# Patient Record
Sex: MALE | Race: WHITE | NOT HISPANIC OR LATINO | Employment: OTHER | URBAN - METROPOLITAN AREA
[De-identification: names, ages, dates, MRNs, and addresses within clinical notes are randomized per-mention and may not be internally consistent; named-entity substitution may affect disease eponyms.]

---

## 2017-08-18 ENCOUNTER — OFFICE VISIT (OUTPATIENT)
Dept: PULMONOLOGY | Facility: HOSPICE | Age: 82
End: 2017-08-18
Payer: MEDICARE

## 2017-08-18 VITALS
RESPIRATION RATE: 16 BRPM | BODY MASS INDEX: 25.76 KG/M2 | HEIGHT: 68 IN | HEART RATE: 58 BPM | OXYGEN SATURATION: 98 % | TEMPERATURE: 97.5 F | DIASTOLIC BLOOD PRESSURE: 78 MMHG | SYSTOLIC BLOOD PRESSURE: 144 MMHG | WEIGHT: 170 LBS

## 2017-08-18 DIAGNOSIS — J44.9 CHRONIC OBSTRUCTIVE PULMONARY DISEASE, UNSPECIFIED COPD TYPE (HCC): ICD-10-CM

## 2017-08-18 PROCEDURE — 99213 OFFICE O/P EST LOW 20 MIN: CPT | Performed by: NURSE PRACTITIONER

## 2017-08-18 NOTE — MR AVS SNAPSHOT
"        Ta Jones   2017 9:40 AM   Office Visit   MRN: [id number]    Department:  [facility]   Dept Phone:  [phone]    Description:  Male : 1930   Provider:  LIZANDRO Mejía           Reason for Visit     Annual Exam           Allergies as of 2017     Allergen Noted Reactions    Advil [Ibuprofen] 2016         You were diagnosed with     Chronic obstructive pulmonary disease, unspecified COPD type (CMS-HCC)   [[id number]]         Vital Signs     Blood Pressure Pulse Temperature Respirations Height Weight    144/78 mmHg 58 36.4 °C (97.5 °F) 16 1.727 m (5' 7.99\") 77.111 kg (170 lb)    Body Mass Index Oxygen Saturation Smoking Status             25.85 kg/m2 98% Former Smoker         Basic Information     Date Of Birth Sex Race Ethnicity Preferred Language    1930 Male White Non- English      Your appointments     2018 11:00 AM   Established Patient Pul with A Rotation   [facility] (--)    [address] [place] [address] [address]  [facility] [id number] [phone]              Problem List              ICD-10-CM Priority Class Noted - Resolved    COPD (chronic obstructive pulmonary disease) (CMS-HCC) J44.9   2017 - Present      Health Maintenance        Date Due Completion Dates    COLONOSCOPY 1980 ---    IMM ZOSTER VACCINE 1990 ---    IMM DTaP/Tdap/Td Vaccine (1 - Tdap) 2010    IMM PNEUMOCOCCAL 65+ (ADULT) LOW/MEDIUM RISK SERIES (2 of 2 - PCV13) 2016    IMM INFLUENZA (1) 2017            Current Immunizations     Influenza TIV (IM) 2015    Pneumococcal polysaccharide vaccine (PPSV-23) 2015    TD Vaccine 2010      Below and/or attached are the medications your provider expects you to take. Review all of your home medications and newly ordered medications with your provider and/or pharmacist. Follow medication instructions as directed by your provider and/or pharmacist. " Please keep your medication list with you and share with your provider. Update the information when medications are discontinued, doses are changed, or new medications (including over-the-counter products) are added; and carry medication information at all times in the event of emergency situations     Allergies:  ADVIL - (reactions not documented)               Medications  Valid as of: August 18, 2017 - 10:01 AM    Generic Name Brand Name Tablet Size Instructions for use    Albuterol Sulfate (Aero Soln) albuterol 108 (90 Base) MCG/ACT Inhale 2 Puffs by mouth every four hours as needed for Shortness of Breath.        Albuterol Sulfate (Aero Soln) albuterol 108 (90 Base) MCG/ACT Inhale 2 Puffs by mouth every 6 hours as needed for Shortness of Breath.        Alendronate Sodium (Tab) FOSAMAX 70 MG Q7 DAYS.         Atorvastatin Calcium (Tab) LIPITOR 20 MG Take 20 mg by mouth every evening.        Cholecalciferol (Tab) Vitamin D3 2000 units Take  by mouth.        Clopidogrel Bisulfate (Tab) PLAVIX 75 MG Take 75 mg by mouth every day. Indications: Heart Attack        Coenzyme Q10 (Cap) Co Q-10 100 MG Take  by mouth.        Formoterol Fumarate (Cap) FORADIL AEROLIZER 12 MCG BID.         HydroCHLOROthiazide   by Does not apply route.        Ipratropium-Albuterol   17 mcg 4X/DAY.         Levalbuterol Tartrate   59 mcg 4X/DAY.         Lovastatin (Tab) MEVACOR 40 MG QDAY.         Metoprolol Succinate (TABLET SR 24 HR) TOPROL XL 25 MG Take 12.5 mg by mouth every day.        Metoprolol-Hydrochlorothiazide (TABLET SR 24 HR) Metoprolol-Hydrochlorothiazide 50-12.5 MG Take 1 Tab by mouth every day.        Mometasone Furoate (AEROSOL POWDER, BREATH ACTIVATED) ASMANEX 220 MCG/INH Inhale 1 Puff by mouth 2 times a day. RINSE MOUTH AFTER USE        Mometasone Furoate (AEROSOL POWDER, BREATH ACTIVATED) ASMANEX 220 MCG/INH Inhale 1 Puff by mouth 2 times a day.        Nitroglycerin (SL Tab) NITROSTAT 0.4 MG Place 0.4 mg under tongue every  5 minutes as needed.        Olodaterol HCl (Aero Soln) Olodaterol HCl 2.5 MCG/ACT Inhale 2.5 mcg by mouth. Indications: Chronic Obstructive Lung Disease        RaNITidine HCl (Tab) ZANTAC 150 MG QDAY.         Salmeterol Xinafoate (AEROSOL POWDER, BREATH ACTIVATED) SEREVENT 50 MCG/DOSE Inhale 1 Puff by mouth 2 times a day.        Simvastatin (Tab) ZOCOR 40 MG Take 40 mg by mouth every evening.        Tamsulosin HCl (Cap) FLOMAX 0.4 MG Take 0.4 mg by mouth ONE-HALF HOUR AFTER BREAKFAST.        Tiotropium Bromide Monohydrate   Inhale  by mouth.        .                 Medicines prescribed today were sent to:     [id number] - [place], [place] - [address] [place] [address]    [address] [place] [address]    Phone: [phone] Fax: [phone]    Open 24 Hours?: No      Medication refill instructions:       If your prescription bottle indicates you have medication refills left, it is not necessary to call your provider’s office. Please contact your pharmacy and they will refill your medication.    If your prescription bottle indicates you do not have any refills left, you may request refills at any time through one of the following ways: The online [employer] system (except Urgent Care), by calling your provider’s office, or by asking your pharmacy to contact your provider’s office with a refill request. Medication refills are processed only during regular business hours and may not be available until the next business day. Your provider may request additional information or to have a follow-up visit with you prior to refilling your medication.   *Please Note: Medication refills are assigned a new Rx number when refilled electronically. Your pharmacy may indicate that no refills were authorized even though a new prescription for the same medication is available at the pharmacy. Please request the medicine by name with the pharmacy before contacting your provider for a refill.           MyChart Status: Patient Declined

## 2017-08-18 NOTE — PATIENT INSTRUCTIONS
1. Continue Asmanex one inhalation twice daily. Rinse mouth thoroughly after use.  2. Continue Striverdi 2 inhalations daily.  3. Continue Proventil and then start Proair Respiclick 2 inhalations every 4 hours as needed for shortness of breath.  4. Pulmonary function tests at next visit in one year.  5. Recommend Flonase Sensimist one spray each nostril daily.

## 2017-08-18 NOTE — PROGRESS NOTES
"Chief Complaint   Patient presents with   • Annual Exam         HPI:  This is a 87 y.o. male, who likes to be called Truong with a history of asthma/obstructive pulmonary disease.  He is accompanied by his wife Iveth. Spirometry from 11/19/12 indicates FEV1 1.44 L, 57% predicted, FEV1/FVC 64%, and FEF 25-75% 41% predicted. [facility] He is currently using Asmanex 220 µg one inhalation twice daily and Striverdi 2 inhalations daily. He currently has Proventil but this is being changed to pro-air Respiclick. In general he feels that his pulmonary status is stable. He denies cough, fever, chills, sweats, and hemoptysis. He walks daily. There've been no changes to his health in the past year.    Past Medical History   Diagnosis Date   • Hernia of unspecified site of abdominal cavity without mention of obstruction or gangrene      Hiatal   • Respiratory failure (CMS-HCC)      x 2   • Asthma    • Osteoarthritis    • Dyslipidemia    • STEMI (ST elevation myocardial infarction) (CMS-HCC)    • Ischemic cardiomyopathy    • Hypertension    • COPD (chronic obstructive pulmonary disease) (CMS-HCC) 8/18/2017       Past Surgical History   Procedure Laterality Date   • Cataract extraction       x 2   • Other orthopedic surgery       plate in wrist       Social History   Substance Use Topics   • Smoking status: Former Smoker -- 0.50 packs/day for 20 years     Types: Cigarettes     Quit date: 01/01/1970   • Smokeless tobacco: None   • Alcohol Use: No       ROS:   Constitutional: Denies fevers, chills, sweats, fatigue, and weight loss.  Eyes: Denies glasses.  Ears/nose/mouth/throat: Denies injury.  Cardiovascular: Denies chest pain, tightness.  Respiratory: See history of present illness.  GI: Denies heartburn, difficulty swallowing, nausea, and vomiting.  Neurological: Denies frequent headaches, dizziness, weakness.    Vitals:  Filed Vitals:    08/18/17 0926   Height: 1.727 m (5' 7.99\") "   Weight: 77.111 kg (170 lb)   Weight % change since last entry.: 0 %   BP: 144/78   Pulse: 58   BMI (Calculated): 25.86   Resp: 16   Temp: 36.4 °C (97.5 °F)   O2 sat % room air: 98 %       Allergies:  Advil    Medications:  Current Outpatient Prescriptions   Medication Sig Dispense Refill   • Olodaterol HCl 2.5 MCG/ACT Aero Soln Inhale 2.5 mcg by mouth. Indications: Chronic Obstructive Lung Disease     • albuterol (PROVENTIL HFA) 108 (90 BASE) MCG/ACT Aero Soln inhalation aerosol Inhale 2 Puffs by mouth every 6 hours as needed for Shortness of Breath.     • MOMETASONE FUROATE 220 MCG/INH INH AEPB Inhale 1 Puff by mouth 2 times a day. RINSE MOUTH AFTER USE     • mometasone (ASMANEX) 220 MCG/INH inhaler Inhale 1 Puff by mouth 2 times a day.     • Cholecalciferol (VITAMIN D3) 2000 UNITS Tab Take  by mouth.     • Coenzyme Q10 (CO Q-10) 100 MG Cap Take  by mouth.     • albuterol 108 (90 BASE) MCG/ACT Aero Soln inhalation aerosol Inhale 2 Puffs by mouth every four hours as needed for Shortness of Breath.     • tamsulosin (FLOMAX) 0.4 MG capsule Take 0.4 mg by mouth ONE-HALF HOUR AFTER BREAKFAST.     • Metoprolol-Hydrochlorothiazide 50-12.5 MG TABLET SR 24 HR Take 1 Tab by mouth every day.     • salmeterol (SEREVENT) 50 MCG/DOSE AEROSOL POWDER, BREATH ACTIVATED Inhale 1 Puff by mouth 2 times a day.     • clopidogrel (PLAVIX) 75 MG TABS Take 75 mg by mouth every day. Indications: Heart Attack     • metoprolol SR (TOPROL XL) 25 MG TB24 Take 12.5 mg by mouth every day.     • atorvastatin (LIPITOR) 20 MG TABS Take 20 mg by mouth every evening.     • nitroglycerin (NITROSTAT) 0.4 MG SUBL Place 0.4 mg under tongue every 5 minutes as needed.     • simvastatin (ZOCOR) 40 MG TABS Take 40 mg by mouth every evening.     • Tiotropium Bromide Monohydrate (SPIRIVA HANDIHALER INH) Inhale  by mouth.     • HYDROCHLOROTHIAZIDE by Does not apply route.     • LEVALBUTEROL TARTRATE INH 59 mcg 4X/DAY.      • ALBUTEROL-IPRATROPIUM INH 17 mcg  4X/DAY.      • FORMOTEROL FUMARATE 12 MCG INH CAPS BID.      • LOVASTATIN 40 MG PO TABS QDAY.      • RANITIDINE  MG PO TABS QDAY.      • ALENDRONATE SODIUM 70 MG PO TABS Q7 DAYS.        No current facility-administered medications for this visit.       PHYSICAL EXAM:  Appearance: Well-developed, well-nourished, no acute distress.  Eyes. PERRL.  Hearing: Grossly intact.  Oropharynx: Tongue normal, posterior pharynx without erythema or exudate.  Respiratory effort: No intercostal retractions or use of accessory muscles.  Lung auscultation: No crackles, wheezing.  Heart auscultation: No murmur, gallop, or rub. Regular rate and rhythm.  Extremities: No cyanosis or edema.  Gait and Station: Normal  Orientation: Oriented to time, place, and person.    Assessment:  1. Chronic obstructive pulmonary disease, unspecified COPD type (CMS-HCC)  AMB PULMONARY FUNCTION TEST/LAB         Plan:  1. Continue Asmanex one inhalation twice daily. Rinse mouth thoroughly after use.  2. Continue Striverdi 2 inhalations daily.  3. Continue Proventil and then start Proair Respiclick 2 inhalations every 4 hours as needed for shortness of breath.  4. Pulmonary function tests at next visit in one year.  5. Recommend Flonase Sensimist one spray each nostril daily.    Return in about 1 year (around 8/18/2018) for With PFT, With MAXIM Vincent.

## 2018-01-03 ENCOUNTER — OFFICE VISIT (OUTPATIENT)
Dept: PULMONOLOGY | Facility: HOSPICE | Age: 83
End: 2018-01-03
Payer: MEDICARE

## 2018-01-03 VITALS
SYSTOLIC BLOOD PRESSURE: 132 MMHG | RESPIRATION RATE: 16 BRPM | WEIGHT: 172 LBS | DIASTOLIC BLOOD PRESSURE: 76 MMHG | BODY MASS INDEX: 26.07 KG/M2 | HEIGHT: 68 IN | HEART RATE: 74 BPM | TEMPERATURE: 97.5 F | OXYGEN SATURATION: 98 %

## 2018-01-03 DIAGNOSIS — J44.9 CHRONIC OBSTRUCTIVE PULMONARY DISEASE, UNSPECIFIED COPD TYPE (HCC): ICD-10-CM

## 2018-01-03 PROCEDURE — 99214 OFFICE O/P EST MOD 30 MIN: CPT | Performed by: INTERNAL MEDICINE

## 2018-01-03 RX ORDER — ALBUTEROL SULFATE 2.5 MG/3ML
SOLUTION RESPIRATORY (INHALATION)
Refills: 3 | COMMUNITY
Start: 2017-12-21

## 2018-01-03 RX ORDER — AMMONIUM LACTATE 12 G/100G
LOTION TOPICAL
Refills: 2 | Status: SHIPPED | COMMUNITY
Start: 2017-11-19 | End: 2019-01-22

## 2018-01-03 NOTE — PROGRESS NOTES
Ta Jones is a 87 y.o. male here for COPD on maintenance inhalers. Patient was referred by his primary care doctor.    History of Present Illness:      Patient is in remarkably good shape at age 88, accompanied by his wife Iveth. He was in the [employer] between 1948 and 1955.    Pulmonary status is been stable, well controlled on maintenance inhalers. Flu vaccine was administered that this year. He is not smoking. Asmanex and striverdi  Continue, he gets his medications at the [facility]. He has not required supplemental oxygen. Pulmonary review of systems does not include new cough hemoptysis purulence and general review of systems does not include weight loss or sweats he remains in good physical shape and will follow up with us in 6 months sooner if needed    Constitutional ROS: No unexpected change in weight, No unexplained fevers  Eyes: No change in vision or blurring or double vision  Mouth/Throat ROS: No sore throat, No recent change in voice or hoarseness  Pulmonary ROS: See present history for pertinent positives  Cardiovascular ROS: No chest pain to suggest acute coronary syndrome  Gastrointestinal ROS: No abdominal pain to suggest peptic disease  Musculoskeletal/Extremities ROS: no acute artritis or unusual swelling  Hematologic/Lymphatic ROS: No easy bleeding or unusual lymph node swelling  Neurologic ROS: No new or unusual weakness  Psychiatric ROS: No hallucinations  Allergic/Immunologic: No  urticaria or allergic rash      Current Outpatient Prescriptions   Medication Sig Dispense Refill   • albuterol (PROVENTIL) 2.5mg/3ml Nebu Soln solution for nebulization USE 3 TIMES A DAY AS NEEDED  3   • ammonium lactate (LAC-HYDRIN) 12 % Lotion APPLY TO AFFECTED AREA TWICE DAILY  2   • Olodaterol HCl 2.5 MCG/ACT Aero Soln Inhale 2 Inhalation by mouth every day. Indications: Chronic Obstructive Lung Disease 1 Inhaler 5   • mometasone (ASMANEX) 220 MCG/INH inhaler Inhale 1 Puff by mouth 2  times a day.     • albuterol (PROVENTIL HFA) 108 (90 BASE) MCG/ACT Aero Soln inhalation aerosol Inhale 2 Puffs by mouth every 6 hours as needed for Shortness of Breath.     • Cholecalciferol (VITAMIN D3) 2000 UNITS Tab Take  by mouth.     • Coenzyme Q10 (CO Q-10) 100 MG Cap Take  by mouth.     • tamsulosin (FLOMAX) 0.4 MG capsule Take 0.4 mg by mouth ONE-HALF HOUR AFTER BREAKFAST.     • metoprolol SR (TOPROL XL) 25 MG TB24 Take 12.5 mg by mouth every day.     • atorvastatin (LIPITOR) 20 MG TABS Take 20 mg by mouth every evening.     • nitroglycerin (NITROSTAT) 0.4 MG SUBL Place 0.4 mg under tongue every 5 minutes as needed.     • RANITIDINE  MG PO TABS QDAY.      • ALENDRONATE SODIUM 70 MG PO TABS Q7 DAYS.      • Metoprolol-Hydrochlorothiazide 50-12.5 MG TABLET SR 24 HR Take 1 Tab by mouth every day.     • clopidogrel (PLAVIX) 75 MG TABS Take 75 mg by mouth every day. Indications: Heart Attack     • simvastatin (ZOCOR) 40 MG TABS Take 40 mg by mouth every evening.     • HYDROCHLOROTHIAZIDE by Does not apply route.     • LEVALBUTEROL TARTRATE INH 59 mcg 4X/DAY.      • LOVASTATIN 40 MG PO TABS QDAY.        No current facility-administered medications for this visit.        Social History   Substance Use Topics   • Smoking status: Former Smoker     Packs/day: 0.50     Years: 20.00     Types: Cigarettes     Quit date: 1/1/1970   • Smokeless tobacco: Never Used   • Alcohol use No        Past Medical History:   Diagnosis Date   • Asthma    • COPD (chronic obstructive pulmonary disease) (CMS-HCC) 8/18/2017   • Dyslipidemia    • Hernia of unspecified site of abdominal cavity without mention of obstruction or gangrene     Hiatal   • Hypertension    • Ischemic cardiomyopathy    • Osteoarthritis    • Respiratory failure (CMS-HCC)     x 2   • STEMI (ST elevation myocardial infarction) (CMS-HCC)        Past Surgical History:   Procedure Laterality Date   • CATARACT EXTRACTION      x 2   • OTHER ORTHOPEDIC SURGERY       "plate in wrist       Allergies: Patient has no active allergies.    Family History   Problem Relation Age of Onset   • Lung Disease Mother        Physical Examination    Vitals:    01/03/18 1052   Height: 1.727 m (5' 7.99\")   Weight: 78 kg (172 lb)   Weight % change since last entry.: 0 %   BP: 132/76   Pulse: 74   BMI (Calculated): 26.16   Resp: 16   Temp: 36.4 °C (97.5 °F)       General Appearance: alert, no distress  Skin: Skin color, texture, turgor normal. No rashes or lesions.  Eyes: negative  Oropharynx: Lips, mucosa, and tongue normal. Teeth and gums normal. Oropharynx moist and without lesion  Lungs: positive findings:Remarkably clear without wheezes  Heart: negative. RRR without murmur, gallop, or rubs.  No ectopy.  Abdomen: Abdomen soft, non-tender. . No masses,  No organomegaly  Extremities:  No deformities, edema, or skin discoloration  Joints: No acute arthritis  Peripheral Pulses:perfused  Neurologic: intact grossly  No cervical adenopathy or supraclavicular adenopathy    II (soft palate, uvula, fauces visible)    Imaging: None presently    PFTS: Ordered for next visit      Assessment and Plan  1. Chronic obstructive pulmonary disease, unspecified COPD type (CMS-HCC)  - AMB PULMONARY FUNCTION TEST/LAB; Future      FolPatient is in remarkably good shape at age 88, accompanied by his wife Iveth. He was in the [employer] between [employer] and [employer].    Pulmonary status is been stable, well controlled on maintenance inhalers. Flu vaccine was administered that this year. He is not smoking. Asmanex and striverdi  Continue, he gets his medications at the [facility]. He has not required supplemental oxygen. Pulmonary review of systems does not include new cough hemoptysis purulence and general review of systems does not include weight loss or sweats he remains in good physical shape and will follow up with us in 6 months sooner if neededlowup Return in about 6 months (around 7/3/2018) for with PFT, " follow up visit with Dr. Lilia Salguero.

## 2018-01-03 NOTE — PATIENT INSTRUCTIONS
Patient is in remarkably good shape at age 88, accompanied by his wife Iveth. He was in the [employer] between 1948 and 1955.    Pulmonary status is been stable, well controlled on maintenance inhalers. Flu vaccine was administered that this year. He is not smoking. Asmanex and striverdi  Continue, he gets his medications at the [facility]. He has not required supplemental oxygen. Pulmonary review of systems does not include new cough hemoptysis purulence and general review of systems does not include weight loss or sweats he remains in good physical shape and will follow up with us in 6 months sooner if needed

## 2018-07-20 ENCOUNTER — OFFICE VISIT (OUTPATIENT)
Dept: PULMONOLOGY | Facility: HOSPICE | Age: 83
End: 2018-07-20
Payer: MEDICARE

## 2018-07-20 ENCOUNTER — NON-PROVIDER VISIT (OUTPATIENT)
Dept: PULMONOLOGY | Facility: HOSPICE | Age: 83
End: 2018-07-20
Payer: MEDICARE

## 2018-07-20 VITALS
WEIGHT: 170 LBS | HEIGHT: 68 IN | BODY MASS INDEX: 25.76 KG/M2 | DIASTOLIC BLOOD PRESSURE: 78 MMHG | TEMPERATURE: 97.9 F | OXYGEN SATURATION: 95 % | SYSTOLIC BLOOD PRESSURE: 123 MMHG | HEART RATE: 80 BPM

## 2018-07-20 DIAGNOSIS — E78.5 DYSLIPIDEMIA: ICD-10-CM

## 2018-07-20 DIAGNOSIS — I10 ESSENTIAL HYPERTENSION: ICD-10-CM

## 2018-07-20 DIAGNOSIS — I25.10 CORONARY ARTERY DISEASE, ANGINA PRESENCE UNSPECIFIED, UNSPECIFIED VESSEL OR LESION TYPE, UNSPECIFIED WHETHER NATIVE OR TRANSPLANTED HEART: ICD-10-CM

## 2018-07-20 DIAGNOSIS — Z87.891 FORMER SMOKER: ICD-10-CM

## 2018-07-20 DIAGNOSIS — J44.9 CHRONIC OBSTRUCTIVE PULMONARY DISEASE, UNSPECIFIED COPD TYPE (HCC): ICD-10-CM

## 2018-07-20 DIAGNOSIS — I25.2 HISTORY OF MI (MYOCARDIAL INFARCTION): ICD-10-CM

## 2018-07-20 PROCEDURE — 99214 OFFICE O/P EST MOD 30 MIN: CPT | Performed by: NURSE PRACTITIONER

## 2018-07-20 PROCEDURE — 94729 DIFFUSING CAPACITY: CPT | Performed by: INTERNAL MEDICINE

## 2018-07-20 PROCEDURE — 94726 PLETHYSMOGRAPHY LUNG VOLUMES: CPT | Performed by: INTERNAL MEDICINE

## 2018-07-20 PROCEDURE — 94060 EVALUATION OF WHEEZING: CPT | Performed by: INTERNAL MEDICINE

## 2018-07-20 ASSESSMENT — PULMONARY FUNCTION TESTS
FEV1_LLN: 1.89
FEV1/FVC_PREDICTED: 70
FEV1: 1.32
FEV1/FVC: 63
FEV1/FVC_PERCENT_LLN: 58
FVC_PERCENT_PREDICTED: 60
FEV1: 1.27
FEV1_PERCENT_PREDICTED: 56
FEV1/FVC: 65.35
FEV1/FVC_PERCENT_CHANGE: 4
FEV1/FVC_PERCENT_PREDICTED: 97
FEV1/FVC_PERCENT_PREDICTED: 93
FEV1_PREDICTED: 2.26
FVC_PREDICTED: 3.32
FEV1/FVC_PERCENT_PREDICTED: 93
FVC_LLN: 2.77
FEV1_PERCENT_CHANGE: 4
FEV1/FVC: 63
FEV1/FVC_PERCENT_PREDICTED: 90
FEV1/FVC_PERCENT_PREDICTED: 68
FVC: 2.02
FVC_PERCENT_PREDICTED: 60
FEV1_PERCENT_PREDICTED: 58
FEV1_PERCENT_CHANGE: 0
FEV1/FVC: 66
FVC: 2.01

## 2018-07-20 NOTE — PROGRESS NOTES
Chief Complaint   Patient presents with   • Follow-Up     6 months w/ pft        HPI:  Ta Jones is a 88 y.o. year old male here today for follow-up on COPD.  Last office visit was 1/3/2018 by Dr. Salguero.  He establish care as a new patient then.  He is accompanied by his wife Iveth.  He was in the [employer] between [employer] and 1955.  His primary care doctor is Benjy Orozco.  Former smoker, quit 1970, 10-pack-year history.  No prior chest x-rays available.  PFT 7/20/2018 indicates stage II COPD with FEV1 1.27 L or 56%, FEV1/FVC ratio 63, TLC 92% and a DLCO of 64% predicted.  No significant bronchodilator response noted.  Patient is compliant with Asmanex 220 mcg 1 puff daily, Striverdi 2 puffs once daily and albuterol HFA inhaler as needed.  He [facility].  [facility] BMI 25.    Today he notes stable dyspnea with exertion. He uses SHANTANU 1-2x's weekly possibly. He denies cough, phlegm, chest tightness, wheezing, allergy symptoms, acid reflux or pedal edema.  He sleeps well through the night only waking 1 time.  He denies morning headaches.  He does occasionally nap during the day depending on how active he is.  He and his wife remain active.  Room air sat is 95%.  He wears hearing aids.  He denies any major changes in health in the last 6 months including hospital stays, procedures or illness.    History of MI 7/2013 with stent placed to LAD.  Followed by Dr. Romero cardiology.  History of coronary artery disease, hypertension and dyslipidemia.      ROS: As per HPI and otherwise negative if not stated.    Past Medical History:   Diagnosis Date   • Asthma    • COPD (chronic obstructive pulmonary disease) (HCC) 8/18/2017   • Dyslipidemia    • Hernia of unspecified site of abdominal cavity without mention of obstruction or gangrene     Hiatal   • Hypertension    • Ischemic cardiomyopathy    • Osteoarthritis    • Respiratory  "failure (HCC)     x 2   • STEMI (ST elevation myocardial infarction) (HCC)        Past Surgical History:   Procedure Laterality Date   • CATARACT EXTRACTION      x 2   • OTHER ORTHOPEDIC SURGERY      plate in wrist       Family History   Problem Relation Age of Onset   • Lung Disease Mother        Social History     Social History   • Marital status:      Spouse name: N/A   • Number of children: N/A   • Years of education: N/A     Occupational History   • Not on file.     Social History Main Topics   • Smoking status: Former Smoker     Packs/day: 0.50     Years: 20.00     Types: Cigarettes     Quit date: 1/1/1970   • Smokeless tobacco: Never Used   • Alcohol use No   • Drug use: No   • Sexual activity: Not on file     Other Topics Concern   • Not on file     Social History Narrative   • No narrative on file       Allergies as of 07/20/2018   • (No Active Allergies)        @Vital signs for this encounter:  Vitals:    07/20/18 1310   Height: 1.727 m (5' 7.99\")   Weight: 77.1 kg (170 lb)   Weight % change since last entry.: 0 %   BP: 123/78   Pulse: 80   BMI (Calculated): 25.86   Temp: 36.6 °C (97.9 °F)   O2 sat % room air: 95 %       Current medications as of today   Current Outpatient Prescriptions   Medication Sig Dispense Refill   • albuterol (PROVENTIL) 2.5mg/3ml Nebu Soln solution for nebulization USE 3 TIMES A DAY AS NEEDED  3   • Olodaterol HCl 2.5 MCG/ACT Aero Soln Inhale 2 Inhalation by mouth every day. Indications: Chronic Obstructive Lung Disease 1 Inhaler 5   • mometasone (ASMANEX) 220 MCG/INH inhaler Inhale 1 Puff by mouth 2 times a day.     • albuterol (PROVENTIL HFA) 108 (90 BASE) MCG/ACT Aero Soln inhalation aerosol Inhale 2 Puffs by mouth every 6 hours as needed for Shortness of Breath.     • Cholecalciferol (VITAMIN D3) 2000 UNITS Tab Take  by mouth.     • Coenzyme Q10 (CO Q-10) 100 MG Cap Take  by mouth.     • tamsulosin (FLOMAX) 0.4 MG capsule Take 0.4 mg by mouth ONE-HALF HOUR AFTER " BREAKFAST.     • Metoprolol-Hydrochlorothiazide 50-12.5 MG TABLET SR 24 HR Take 1 Tab by mouth every day.     • atorvastatin (LIPITOR) 20 MG TABS Take 20 mg by mouth every evening.     • RANITIDINE  MG PO TABS QDAY.      • ammonium lactate (LAC-HYDRIN) 12 % Lotion APPLY TO AFFECTED AREA TWICE DAILY  2   • clopidogrel (PLAVIX) 75 MG TABS Take 75 mg by mouth every day. Indications: Heart Attack     • metoprolol SR (TOPROL XL) 25 MG TB24 Take 12.5 mg by mouth every day.     • nitroglycerin (NITROSTAT) 0.4 MG SUBL Place 0.4 mg under tongue every 5 minutes as needed.     • simvastatin (ZOCOR) 40 MG TABS Take 40 mg by mouth every evening.     • HYDROCHLOROTHIAZIDE by Does not apply route.     • LEVALBUTEROL TARTRATE INH 59 mcg 4X/DAY.      • LOVASTATIN 40 MG PO TABS QDAY.      • ALENDRONATE SODIUM 70 MG PO TABS Q7 DAYS.        No current facility-administered medications for this visit.          Physical Exam:   Gen:           Alert and oriented, No apparent distress. Mood and affect appropriate, normal interaction with examiner.  Eyes:          PERRL, EOM intact, sclere white, conjunctive moist.  Ears:          Not examined.  Hearing aides in place.  Hearing:     Grossly intact.  Nose:          Normal, no lesions or deformities.  Dentition:    Good dentition.  Oropharynx:   Tongue normal, posterior pharynx without erythema or exudate.  Mallampati Classification: 3  Neck:        Supple, trachea midline, no masses.  Respiratory Effort: No intercostal retractions or use of accessory muscles.   Lung Auscultation:      Clear to auscultation bilaterally; no rales, rhonchi or wheezing.  CV:            Regular rate and rhythm. No murmurs, rubs or gallops.  Abd:           Not examined.  Lymphadenopathy: Not examined.  Gait and Station: Normal.  Digits and Nails: No clubbing, cyanosis, petechiae, or nodes.   Cranial Nerves: II-XII grossly intact.  Skin:        No rashes, lesions or ulcers noted.               Ext:            No cyanosis or edema.      Assessment:  1. Chronic obstructive pulmonary disease, unspecified COPD type (HCC)     2. Former smoker     3. Dyslipidemia     4. Essential hypertension     5. History of MI (myocardial infarction)     6. Coronary artery disease, angina presence unspecified, unspecified vessel or lesion type, unspecified whether native or transplanted heart         Immunizations:    Flu:10/2017  Pneumovax 23:2015  [facility]    Plan: COPD is clinically stable.  Patient does very well for 8 years old.  No change in treatment plan.  1.  Continue inhaler regimen.  2.  Discussed respiratory hygiene.  3.  Encourage routine activities.  4.  Follow-up in 6 months, sooner if needed.

## 2018-07-20 NOTE — PROCEDURES
Good patient effort & cooperation.  The results of this test meet the ATS/ERS standards for acceptability and repeatability.  Predicted equations for Spirometry are N-Damon II, ITS for lung volumes, and [facility] for DLCO.  The DLCO was uncorrected for Hgb.  A bronchodilator of Ventolin HFA- 2puffs via spacer were administered.  DLCO was performed during dilation period.    Pulmonary function testing was completed on July 20, 2018.Mid  flows are reduced to 45% of predicted. FEV1 is low, 1.27 L, 56% predicted.This is a moderate obstructive pattern.Bronchodilator response was not seen.Moderate hyperinflation is present. Oxygen transfer is reduced. The flow volume loop shows an obstructive pattern With good effort noted

## 2018-07-20 NOTE — LETTER
CRISTINA Ochoa  [facility] Pulmonary Medicine   [address] [place] [address] [place] [address]  Phone: [phone] - Fax: [phone]           Encounter Date: 7/20/2018  Provider: CRISTINA Ochoa  Location of Care: [facility] PULMONARY MEDICINE      Patient:   Ta Jones   MR Number: [id number]   YOB: 1930     PROGRESS NOTE:  Chief Complaint   Patient presents with   • Follow-Up     6 months w/ pft        HPI:  Ta Jones is a 88 y.o. year old male here today for follow-up on COPD.  Last office visit was 1/3/2018 by Dr. Salguero.  He establish care as a new patient then.  He is accompanied by his wife Iveth.  He was in the [employer] between [place] and 1955.  His primary care doctor is Benjy Orozco.  Former smoker, quit 1970, 10-pack-year history.  No prior chest x-rays available.  PFT 7/20/2018 indicates stage II COPD with FEV1 1.27 L or 56%, FEV1/FVC ratio 63, TLC 92% and a DLCO of 64% predicted.  No significant bronchodilator response noted.  Patient is compliant with Asmanex 220 mcg 1 puff daily, Striverdi 2 puffs once daily and albuterol HFA inhaler as needed.  He currently obtains his medications from the [facility].  [facility]. BMI 25.    Today he notes stable dyspnea with exertion. He uses SHANTANU 1-2x's weekly possibly. He denies cough, phlegm, chest tightness, wheezing, allergy symptoms, acid reflux or pedal edema.  He sleeps well through the night only waking 1 time.  He denies morning headaches.  He does occasionally nap during the day depending on how active he is.  He and his wife remain active.  Room air sat is 95%.  He wears hearing aids.  He denies any major changes in health in the last 6 months including hospital stays, procedures or illness.    History of MI 7/2013 with stent placed to LAD.  Followed by Dr. Romero cardiology.  History of coronary artery disease, hypertension  "and dyslipidemia.      ROS: As per HPI and otherwise negative if not stated.    Past Medical History:   Diagnosis Date   • Asthma    • COPD (chronic obstructive pulmonary disease) ([facility]) 8/18/2017   • Dyslipidemia    • Hernia of unspecified site of abdominal cavity without mention of obstruction or gangrene     Hiatal   • Hypertension    • Ischemic cardiomyopathy    • Osteoarthritis    • Respiratory failure ([facility])     x 2   • STEMI (ST elevation myocardial infarction) ([facility])        Past Surgical History:   Procedure Laterality Date   • CATARACT EXTRACTION      x 2   • OTHER ORTHOPEDIC SURGERY      plate in wrist       Family History   Problem Relation Age of Onset   • Lung Disease Mother        Social History     Social History   • Marital status:      Spouse name: N/A   • Number of children: N/A   • Years of education: N/A     Occupational History   • Not on file.     Social History Main Topics   • Smoking status: Former Smoker     Packs/day: 0.50     Years: 20.00     Types: Cigarettes     Quit date: 1/1/1970   • Smokeless tobacco: Never Used   • Alcohol use No   • Drug use: No   • Sexual activity: Not on file     Other Topics Concern   • Not on file     Social History Narrative   • No narrative on file       Allergies as of 07/20/2018   • (No Active Allergies)        @Vital signs for this encounter:  Vitals:    07/20/18 1310   Height: 1.727 m (5' 7.99\")   Weight: 77.1 kg (170 lb)   Weight % change since last entry.: 0 %   BP: 123/78   Pulse: 80   BMI (Calculated): 25.86   Temp: 36.6 °C (97.9 °F)   O2 sat % room air: 95 %       Current medications as of today   Current Outpatient Prescriptions   Medication Sig Dispense Refill   • albuterol (PROVENTIL) 2.5mg/3ml Nebu Soln solution for nebulization USE 3 TIMES A DAY AS NEEDED  3   • Olodaterol HCl 2.5 MCG/ACT Aero Soln Inhale 2 Inhalation by mouth every day. Indications: Chronic Obstructive Lung Disease 1 Inhaler 5   • mometasone (ASMANEX) 220 MCG/INH inhaler " Inhale 1 Puff by mouth 2 times a day.     • albuterol (PROVENTIL HFA) 108 (90 BASE) MCG/ACT Aero Soln inhalation aerosol Inhale 2 Puffs by mouth every 6 hours as needed for Shortness of Breath.     • Cholecalciferol (VITAMIN D3) 2000 UNITS Tab Take  by mouth.     • Coenzyme Q10 (CO Q-10) 100 MG Cap Take  by mouth.     • tamsulosin (FLOMAX) 0.4 MG capsule Take 0.4 mg by mouth ONE-HALF HOUR AFTER BREAKFAST.     • Metoprolol-Hydrochlorothiazide 50-12.5 MG TABLET SR 24 HR Take 1 Tab by mouth every day.     • atorvastatin (LIPITOR) 20 MG TABS Take 20 mg by mouth every evening.     • RANITIDINE  MG PO TABS QDAY.      • ammonium lactate (LAC-HYDRIN) 12 % Lotion APPLY TO AFFECTED AREA TWICE DAILY  2   • clopidogrel (PLAVIX) 75 MG TABS Take 75 mg by mouth every day. Indications: Heart Attack     • metoprolol SR (TOPROL XL) 25 MG TB24 Take 12.5 mg by mouth every day.     • nitroglycerin (NITROSTAT) 0.4 MG SUBL Place 0.4 mg under tongue every 5 minutes as needed.     • simvastatin (ZOCOR) 40 MG TABS Take 40 mg by mouth every evening.     • HYDROCHLOROTHIAZIDE by Does not apply route.     • LEVALBUTEROL TARTRATE INH 59 mcg 4X/DAY.      • LOVASTATIN 40 MG PO TABS QDAY.      • ALENDRONATE SODIUM 70 MG PO TABS Q7 DAYS.        No current facility-administered medications for this visit.          Physical Exam:   Gen:           Alert and oriented, No apparent distress. Mood and affect appropriate, normal interaction with examiner.  Eyes:          PERRL, EOM intact, sclere white, conjunctive moist.  Ears:          Not examined.  Hearing aides in place.  Hearing:     Grossly intact.  Nose:          Normal, no lesions or deformities.  Dentition:    Good dentition.  Oropharynx:   Tongue normal, posterior pharynx without erythema or exudate.  Mallampati Classification: 3  Neck:        Supple, trachea midline, no masses.  Respiratory Effort: No intercostal retractions or use of accessory muscles.   Lung Auscultation:      Clear to  auscultation bilaterally; no rales, rhonchi or wheezing.  CV:            Regular rate and rhythm. No murmurs, rubs or gallops.  Abd:           Not examined.  Lymphadenopathy: Not examined.  Gait and Station: Normal.  Digits and Nails: No clubbing, cyanosis, petechiae, or nodes.   Cranial Nerves: II-XII grossly intact.  Skin:        No rashes, lesions or ulcers noted.               Ext:           No cyanosis or edema.      Assessment:  1. Chronic obstructive pulmonary disease, unspecified COPD type (HCC)     2. Former smoker     3. Dyslipidemia     4. Essential hypertension     5. History of MI (myocardial infarction)     6. Coronary artery disease, angina presence unspecified, unspecified vessel or lesion type, unspecified whether native or transplanted heart         Immunizations:    Flu:10/2017  Pneumovax 23:2015  [facility]    Plan: COPD is clinically stable.  Patient does very well for 8 years old.  No change in treatment plan.  1.  Continue inhaler regimen.  2.  Discussed respiratory hygiene.  3.  Encourage routine activities.  4.  Follow-up in 6 months, sooner if needed.            Electronically signed by FEDERICO OchoaRELAINE  on 07/20/18    Benjy Orozco M.D.  [address]  [place] [id number]  VIA Facsimile: [phone]

## 2018-10-10 ENCOUNTER — HOSPITAL ENCOUNTER (OUTPATIENT)
Dept: HOSPITAL 8 - CVU | Age: 83
Discharge: HOME | End: 2018-10-10
Attending: INTERNAL MEDICINE
Payer: MEDICARE

## 2018-10-10 DIAGNOSIS — I34.8: ICD-10-CM

## 2018-10-10 DIAGNOSIS — Z87.891: ICD-10-CM

## 2018-10-10 DIAGNOSIS — I35.8: ICD-10-CM

## 2018-10-10 DIAGNOSIS — I25.2: ICD-10-CM

## 2018-10-10 DIAGNOSIS — I34.0: Primary | ICD-10-CM

## 2018-10-10 DIAGNOSIS — E78.5: ICD-10-CM

## 2018-10-10 DIAGNOSIS — I25.10: ICD-10-CM

## 2018-10-10 DIAGNOSIS — I10: ICD-10-CM

## 2018-10-10 PROCEDURE — 93306 TTE W/DOPPLER COMPLETE: CPT

## 2018-10-10 PROCEDURE — 93880 EXTRACRANIAL BILAT STUDY: CPT

## 2019-01-22 ENCOUNTER — OFFICE VISIT (OUTPATIENT)
Dept: PULMONOLOGY | Facility: HOSPICE | Age: 84
End: 2019-01-22
Payer: MEDICARE

## 2019-01-22 VITALS
RESPIRATION RATE: 16 BRPM | BODY MASS INDEX: 25.61 KG/M2 | HEART RATE: 74 BPM | WEIGHT: 169 LBS | DIASTOLIC BLOOD PRESSURE: 70 MMHG | SYSTOLIC BLOOD PRESSURE: 142 MMHG | OXYGEN SATURATION: 94 % | TEMPERATURE: 97.3 F | HEIGHT: 68 IN

## 2019-01-22 DIAGNOSIS — Z87.891 FORMER SMOKER: ICD-10-CM

## 2019-01-22 DIAGNOSIS — I10 ESSENTIAL HYPERTENSION: ICD-10-CM

## 2019-01-22 DIAGNOSIS — J44.9 CHRONIC OBSTRUCTIVE PULMONARY DISEASE, UNSPECIFIED COPD TYPE (HCC): Chronic | ICD-10-CM

## 2019-01-22 DIAGNOSIS — E78.5 DYSLIPIDEMIA: ICD-10-CM

## 2019-01-22 PROCEDURE — 99214 OFFICE O/P EST MOD 30 MIN: CPT | Performed by: NURSE PRACTITIONER

## 2019-01-22 RX ORDER — METOPROLOL SUCCINATE 50 MG/1
TABLET, EXTENDED RELEASE ORAL
COMMUNITY
Start: 2019-01-12

## 2019-01-22 RX ORDER — LISINOPRIL 10 MG/1
TABLET ORAL
COMMUNITY
Start: 2019-01-15

## 2019-01-22 RX ORDER — BUDESONIDE AND FORMOTEROL FUMARATE DIHYDRATE 160; 4.5 UG/1; UG/1
2 AEROSOL RESPIRATORY (INHALATION) 2 TIMES DAILY
Qty: 1 INHALER | Refills: 11 | Status: SHIPPED | OUTPATIENT
Start: 2019-01-22

## 2019-01-22 RX ORDER — CHOLECALCIFEROL (VITAMIN D3) 25 MCG
TABLET,CHEWABLE ORAL
COMMUNITY

## 2019-01-22 RX ORDER — TIOTROPIUM BROMIDE 18 UG/1
CAPSULE ORAL; RESPIRATORY (INHALATION)
Qty: 30 CAP | Refills: 11 | Status: SHIPPED | OUTPATIENT
Start: 2019-01-22

## 2019-01-22 NOTE — PROGRESS NOTES
Chief Complaint   Patient presents with   • COPD       HPI:  Ta Jones is a 88 y.o. year old male here today for follow-up on COPD. He established care 1/3/18 with Dr. Salguero. He is accompanied by his wife Iveth.  He was in the [employer]. between 1948 and 1955.  His primary care doctor is Jade AYALA.  Former smoker, quit 1970, 10-pack-year history.  No prior chest x-rays available.  PFT 7/20/2018 indicates stage II COPD with FEV1 1.27 L or 56%, FEV1/FVC ratio 63, TLC 92% and a DLCO of 64% predicted.  No significant bronchodilator response noted.  Patient is compliant with Asmanex 220 mcg 1 puff BID, Striverdi 2 puffs once daily and albuterol HFA inhaler as needed. He currently obtains his medications [facility] the [facility].  [facility]. BMI 25.     Today he notes stable dyspnea with exertion. He uses SHANTANU 1-2x's weekly possibly. His wife notes him becoming short of breath with walking. She has heard occasional wheeze. He denies cough, phlegm, chest tightness, wheezing, allergy symptoms, acid reflux or pedal edema.  He wears hearing aids.  He denies any major changes in health in the last 6 months including hospital stays, procedures or illness.     History of MI 7/2013 with stent placed to LAD.  Followed by Dr. Romero cardiology.  History of coronary artery disease, hypertension and dyslipidemia.      ROS: As per HPI and otherwise negative if not stated.    Past Medical History:   Diagnosis Date   • Asthma    • COPD (chronic obstructive pulmonary disease) (HCC) 8/18/2017   • Dyslipidemia    • Hernia of unspecified site of abdominal cavity without mention of obstruction or gangrene     Hiatal   • Hypertension    • Ischemic cardiomyopathy    • Osteoarthritis    • Respiratory failure (HCC)     x 2   • STEMI (ST elevation myocardial infarction) ([facility])        Past Surgical History:   Procedure Laterality Date   • CATARACT EXTRACTION      x 2   • OTHER ORTHOPEDIC  "SURGERY      plate in wrist       Family History   Problem Relation Age of Onset   • Lung Disease Mother        Social History     Social History   • Marital status:      Spouse name: N/A   • Number of children: N/A   • Years of education: N/A     Occupational History   • Not on file.     Social History Main Topics   • Smoking status: Former Smoker     Packs/day: 0.50     Years: 20.00     Types: Cigarettes     Quit date: 1/1/1970   • Smokeless tobacco: Never Used   • Alcohol use No   • Drug use: No   • Sexual activity: Not on file     Other Topics Concern   • Not on file     Social History Narrative   • No narrative on file       Allergies as of 01/22/2019   • (No Known Allergies)        @Vital signs for this encounter:  Vitals:    01/22/19 1011 01/22/19 1023   Height: 1.727 m (5' 7.99\")    Weight: 76.7 kg (169 lb)    Weight % change since last entry.: 0 %    BP: 142/70    Pulse: 74    BMI (Calculated): 25.7    Resp: 16    Temp: 36.3 °C (97.3 °F)    TempSrc: Temporal    O2 sat % room air:  94 %       Current medications as of today   Current Outpatient Prescriptions   Medication Sig Dispense Refill   • lisinopril (PRINIVIL) 10 MG Tab      • metoprolol SR (TOPROL XL) 50 MG TABLET SR 24 HR      • SHINGRIX 50 MCG Recon Susp      • Cyanocobalamin (B-12) 1000 MCG Cap Take  by mouth.     • Olodaterol HCl (STRIVERDI RESPIMAT) 2.5 MCG/ACT Aero Soln Inhale 2 Puffs by mouth every day. 1 Inhaler 5   • mometasone (ASMANEX 60 METERED DOSES) 220 MCG/INH inhaler Inhale 1 Puff by mouth every day. Rinse mouth after each use. 1 Inhaler 5   • albuterol (PROVENTIL) 2.5mg/3ml Nebu Soln solution for nebulization USE 3 TIMES A DAY AS NEEDED  3   • Olodaterol HCl 2.5 MCG/ACT Aero Soln Inhale 2 Inhalation by mouth every day. Indications: Chronic Obstructive Lung Disease 1 Inhaler 5   • mometasone (ASMANEX) 220 MCG/INH inhaler Inhale 1 Puff by mouth 2 times a day.     • albuterol (PROVENTIL HFA) 108 (90 BASE) MCG/ACT Aero Soln " inhalation aerosol Inhale 2 Puffs by mouth every 6 hours as needed for Shortness of Breath.     • Cholecalciferol (VITAMIN D3) 2000 UNITS Tab Take  by mouth.     • Coenzyme Q10 (CO Q-10) 100 MG Cap Take  by mouth.     • tamsulosin (FLOMAX) 0.4 MG capsule Take 0.4 mg by mouth ONE-HALF HOUR AFTER BREAKFAST.     • clopidogrel (PLAVIX) 75 MG TABS Take 75 mg by mouth every day. Indications: Heart Attack     • metoprolol SR (TOPROL XL) 25 MG TB24 Take 12.5 mg by mouth every day.     • atorvastatin (LIPITOR) 20 MG TABS Take 20 mg by mouth every evening.     • LEVALBUTEROL TARTRATE INH 59 mcg 4X/DAY.      • LOVASTATIN 40 MG PO TABS QDAY.      • RANITIDINE  MG PO TABS QDAY.      • ammonium lactate (LAC-HYDRIN) 12 % Lotion APPLY TO AFFECTED AREA TWICE DAILY  2   • Metoprolol-Hydrochlorothiazide 50-12.5 MG TABLET SR 24 HR Take 1 Tab by mouth every day.     • nitroglycerin (NITROSTAT) 0.4 MG SUBL Place 0.4 mg under tongue every 5 minutes as needed.     • simvastatin (ZOCOR) 40 MG TABS Take 40 mg by mouth every evening.     • HYDROCHLOROTHIAZIDE by Does not apply route.     • ALENDRONATE SODIUM 70 MG PO TABS Q7 DAYS.        No current facility-administered medications for this visit.          Physical Exam:   Gen:           Alert and oriented, No apparent distress. Mood and affect appropriate, normal interaction with examiner.  Eyes:          PERRL, EOM intact, sclere white, conjunctive moist.  Ears:          Not examined.   Hearing:     Grossly intact.  Nose:          Normal, no lesions or deformities.  Dentition:    Good dentition.  Oropharynx:   Tongue normal, posterior pharynx without erythema or exudate.  Mallampati Classification: 2/3  Neck:        Supple, trachea midline, no masses.  Respiratory Effort: No intercostal retractions or use of accessory muscles.   Lung Auscultation:      Trace expiratory wheeze to bilateral lobes; no rales, rhonchi.  CV:            Regular rate and rhythm. No murmurs, rubs or  gallops.  Abd:           Not examined.   Lymphadenopathy: Not examined.  Gait and Station: Normal.  Digits and Nails: No clubbing, cyanosis, petechiae, or nodes.   Cranial Nerves: II-XII grossly intact.  Skin:        No rashes, lesions or ulcers noted.               Ext:           No cyanosis or edema.      Assessment:  1. Chronic obstructive pulmonary disease, unspecified COPD type (HCC)     2. Former smoker     3. Essential hypertension     4. Dyslipidemia     5. BMI 25.0-25.9,adult         Immunizations:    Flu:10/2018  Pneumovax 23:2015  [facility]ar [facility]    Plan: COPD may be slightly progressing due to some increased dyspnea and wheezing noted by patient's wife.  1.  Reviewed inhaler regimen.  Recommend switching patient to Symbicort 160/4.5 mcg 2 puffs daily and Spiriva HandiHaler once daily.  He may continue his albuterol HFA inhaler as needed.  Rx is given to patient to [facility] at [facility] appointment to try.  2.  Discussed respiratory hygiene.  3.  Encourage routine walking.  4.  Follow-up in 6 months to check symptoms, sooner if needed.    Please note that this dictation was created using voice recognition software. I have made every reasonable attempt to correct obvious errors, but it is possible there are errors of grammar and possibly content that I did not discover before finalizing the note.

## 2019-01-22 NOTE — LETTER
CRISTINA Ochoa  [facility]   [address] [place] [address] [place] [address]  Phone: [phone] - Fax: [phone]           Encounter Date: 1/22/2019  Provider: CRISTINA Ochoa  Location of Care: [facility] PULMONARY MEDICINE      Patient:   Ta Jones   MR Number: [id number]   YOB: 1930     PROGRESS NOTE:  Chief Complaint   Patient presents with   • COPD       HPI:  Ta Jones is a 88 y.o. year old male here today for follow-up on COPD. He established care 1/3/18 with Dr. Salguero. He is accompanied by his wife Iveth.  He was in the [employer]. between 1948 and 1955.  His primary care doctor is Jade AYALA.  Former smoker, quit 1970, 10-pack-year history.  No prior chest x-rays available.  PFT 7/20/2018 indicates stage II COPD with FEV1 1.27 L or 56%, FEV1/FVC ratio 63, TLC 92% and a DLCO of 64% predicted.  No significant bronchodilator response noted.  Patient is compliant with Asmanex 220 mcg 1 puff BID, Striverdi 2 puffs once daily and albuterol HFA inhaler as needed. [facility]  [facility]. BMI 25.     Today he notes stable dyspnea with exertion. He uses SHANTANU 1-2x's weekly possibly.  His wife notes him becoming short of breath with walking. She has heard occasional wheeze. He denies cough, phlegm, chest tightness, wheezing, allergy symptoms, acid reflux or pedal edema.  He wears hearing aids.  He denies any major changes in health in the last 6 months including hospital stays, procedures or illness.     History of MI 7/2013 with stent placed to LAD.  Followed by Dr. Romero cardiology.  History of coronary artery disease, hypertension and dyslipidemia.      ROS: As per HPI and otherwise negative if not stated.    Past Medical History:   Diagnosis Date   • Asthma    • COPD (chronic obstructive pulmonary disease) (HCC)  "8/18/2017   • Dyslipidemia    • Hernia of unspecified site of abdominal cavity without mention of obstruction or gangrene     Hiatal   • Hypertension    • Ischemic cardiomyopathy    • Osteoarthritis    • Respiratory failure (HCC)     x 2   • STEMI (ST elevation myocardial infarction) (HCC)        Past Surgical History:   Procedure Laterality Date   • CATARACT EXTRACTION      x 2   • OTHER ORTHOPEDIC SURGERY      plate in wrist       Family History   Problem Relation Age of Onset   • Lung Disease Mother        Social History     Social History   • Marital status:      Spouse name: N/A   • Number of children: N/A   • Years of education: N/A     Occupational History   • Not on file.     Social History Main Topics   • Smoking status: Former Smoker     Packs/day: 0.50     Years: 20.00     Types: Cigarettes     Quit date: 1/1/1970   • Smokeless tobacco: Never Used   • Alcohol use No   • Drug use: No   • Sexual activity: Not on file     Other Topics Concern   • Not on file     Social History Narrative   • No narrative on file       Allergies as of 01/22/2019   • (No Known Allergies)        @Vital signs for this encounter:  Vitals:    01/22/19 1011 01/22/19 1023   Height: 1.727 m (5' 7.99\")    Weight: 76.7 kg (169 lb)    Weight % change since last entry.: 0 %    BP: 142/70    Pulse: 74    BMI (Calculated): 25.7    Resp: 16    Temp: 36.3 °C (97.3 °F)    TempSrc: Temporal    O2 sat % room air:  94 %       Current medications as of today   Current Outpatient Prescriptions   Medication Sig Dispense Refill   • lisinopril (PRINIVIL) 10 MG Tab      • metoprolol SR (TOPROL XL) 50 MG TABLET SR 24 HR      • SHINGRIX 50 MCG Recon Susp      • Cyanocobalamin (B-12) 1000 MCG Cap Take  by mouth.     • Olodaterol HCl (STRIVERDI RESPIMAT) 2.5 MCG/ACT Aero Soln Inhale 2 Puffs by mouth every day. 1 Inhaler 5   • mometasone (ASMANEX 60 METERED DOSES) 220 MCG/INH inhaler Inhale 1 Puff by mouth every day. Rinse mouth after each use. 1 " Inhaler 5   • albuterol (PROVENTIL) 2.5mg/3ml Nebu Soln solution for nebulization USE 3 TIMES A DAY AS NEEDED  3   • Olodaterol HCl 2.5 MCG/ACT Aero Soln Inhale 2 Inhalation by mouth every day. Indications: Chronic Obstructive Lung Disease 1 Inhaler 5   • mometasone (ASMANEX) 220 MCG/INH inhaler Inhale 1 Puff by mouth 2 times a day.     • albuterol (PROVENTIL HFA) 108 (90 BASE) MCG/ACT Aero Soln inhalation aerosol Inhale 2 Puffs by mouth every 6 hours as needed for Shortness of Breath.     • Cholecalciferol (VITAMIN D3) 2000 UNITS Tab Take  by mouth.     • Coenzyme Q10 (CO Q-10) 100 MG Cap Take  by mouth.     • tamsulosin (FLOMAX) 0.4 MG capsule Take 0.4 mg by mouth ONE-HALF HOUR AFTER BREAKFAST.     • clopidogrel (PLAVIX) 75 MG TABS Take 75 mg by mouth every day. Indications: Heart Attack     • metoprolol SR (TOPROL XL) 25 MG TB24 Take 12.5 mg by mouth every day.     • atorvastatin (LIPITOR) 20 MG TABS Take 20 mg by mouth every evening.     • LEVALBUTEROL TARTRATE INH 59 mcg 4X/DAY.      • LOVASTATIN 40 MG PO TABS QDAY.      • RANITIDINE  MG PO TABS QDAY.      • ammonium lactate (LAC-HYDRIN) 12 % Lotion APPLY TO AFFECTED AREA TWICE DAILY  2   • Metoprolol-Hydrochlorothiazide 50-12.5 MG TABLET SR 24 HR Take 1 Tab by mouth every day.     • nitroglycerin (NITROSTAT) 0.4 MG SUBL Place 0.4 mg under tongue every 5 minutes as needed.     • simvastatin (ZOCOR) 40 MG TABS Take 40 mg by mouth every evening.     • HYDROCHLOROTHIAZIDE by Does not apply route.     • ALENDRONATE SODIUM 70 MG PO TABS Q7 DAYS.        No current facility-administered medications for this visit.          Physical Exam:   Gen:           Alert and oriented, No apparent distress. Mood and affect appropriate, normal interaction with examiner.  Eyes:          PERRL, EOM intact, sclere white, conjunctive moist.  Ears:          Not examined.   Hearing:     Grossly intact.  Nose:          Normal, no lesions or deformities.  Dentition:    Good  dentition.  Oropharynx:   Tongue normal, posterior pharynx without erythema or exudate.  Mallampati Classification: 2/3  Neck:        Supple, trachea midline, no masses.  Respiratory Effort: No intercostal retractions or use of accessory muscles.   Lung Auscultation:      Trace expiratory wheeze to bilateral lobes; no rales, rhonchi.  CV:            Regular rate and rhythm. No murmurs, rubs or gallops.  Abd:           Not examined.   Lymphadenopathy: Not examined.  Gait and Station: Normal.  Digits and Nails: No clubbing, cyanosis, petechiae, or nodes.   Cranial Nerves: II-XII grossly intact.  Skin:        No rashes, lesions or ulcers noted.               Ext:           No cyanosis or edema.      Assessment:  1. Chronic obstructive pulmonary disease, unspecified COPD type (HCC)     2. Former smoker     3. Essential hypertension     4. Dyslipidemia     5. BMI 25.0-25.9,adult         Immunizations:    Flu:10/2018  Pneumovax 23:2015  [facility]vnar 13:[facility]    Plan: COPD may be slightly progressing due to some increased dyspnea and wheezing noted by patient's wife.  1.  Reviewed inhaler regimen.  Recommend switching patient to Symbicort 160/4.5 mcg 2 puffs daily and Spiriva HandiHaler once daily.  He may continue his albuterol HFA inhaler as needed.  Rx is given to patient to discuss with [facility] at [facility] to try.  2.  Discussed respiratory hygiene.  3.  Encourage routine walking.  4.  Follow-up in 6 months to check symptoms, sooner if needed.    Please note that this dictation was created using voice recognition software. I have made every reasonable attempt to correct obvious errors, but it is possible there are errors of grammar and possibly content that I did not discover before finalizing the note.        Electronically signed by CRISTINA Ochoa  on 01/22/19    [facility]  [address] [facility] [address]  VIA Facsimile: [phone]

## 2019-01-22 NOTE — LETTER
CRISTINA Ochoa  [facility]   [address] [place] [address] [place] [address]  Phone: [phone] - Fax: [phone]           Encounter Date: 1/22/2019  Provider: CRISTINA Ochoa  Location of Care: [facility] PULMONARY MEDICINE      Patient:   Ta Jones   MR Number: [id number]   YOB: 1930     PROGRESS NOTE:  Chief Complaint   Patient presents with   • COPD       HPI:  Ta Jones is a 88 y.o. year old male here today for follow-up on COPD. He established care 1/3/18 with Dr. Salguero. He is accompanied by his wife Iveth.  He was in the [employer]. between 1948 and 1955.  His primary care doctor is Jade AYALA.  Former smoker, quit 1970, 10-pack-year history.  No prior chest x-rays available.  PFT 7/20/2018 indicates stage II COPD with FEV1 1.27 L or 56%, FEV1/FVC ratio 63, TLC 92% and a DLCO of 64% predicted.  No significant bronchodilator response noted.  Patient is compliant with Asmanex 220 mcg 1 puff daily, Striverdi 2 puffs once daily and albuterol HFA inhaler as needed.  [facility].  [facility]. BMI 25.     Today he notes stable dyspnea with exertion. He uses SHANTANU 1-2x's weekly possibly. He denies cough, phlegm, chest tightness, wheezing, allergy symptoms, acid reflux or pedal edema.  He sleeps well through the night only waking 1 time.  He denies morning headaches.  He does occasionally nap during the day depending on how active he is.  He and his wife remain active.  Room air sat is 95%.  He wears hearing aids.  He denies any major changes in health in the last 6 months including hospital stays, procedures or illness.     History of MI 7/2013 with stent placed to LAD.  Followed by Dr. Romero cardiology.  History of coronary artery disease, hypertension and dyslipidemia.          ROS: As per HPI and otherwise  "negative if not stated.    Past Medical History:   Diagnosis Date   • Asthma    • COPD (chronic obstructive pulmonary disease) ([facility]) 8/18/2017   • Dyslipidemia    • Hernia of unspecified site of abdominal cavity without mention of obstruction or gangrene     Hiatal   • Hypertension    • Ischemic cardiomyopathy    • Osteoarthritis    • Respiratory failure ([facility])     x 2   • STEMI (ST elevation myocardial infarction) ([facility])        Past Surgical History:   Procedure Laterality Date   • CATARACT EXTRACTION      x 2   • OTHER ORTHOPEDIC SURGERY      plate in wrist       Family History   Problem Relation Age of Onset   • Lung Disease Mother        Social History     Social History   • Marital status:      Spouse name: N/A   • Number of children: N/A   • Years of education: N/A     Occupational History   • Not on file.     Social History Main Topics   • Smoking status: Former Smoker     Packs/day: 0.50     Years: 20.00     Types: Cigarettes     Quit date: 1/1/1970   • Smokeless tobacco: Never Used   • Alcohol use No   • Drug use: No   • Sexual activity: Not on file     Other Topics Concern   • Not on file     Social History Narrative   • No narrative on file       Allergies as of 01/22/2019   • (No Known Allergies)        @Vital signs for this encounter:  Vitals:    01/22/19 1011 01/22/19 1023   Height: 1.727 m (5' 7.99\")    Weight: 76.7 kg (169 lb)    Weight % change since last entry.: 0 %    BP: 142/70    Pulse: 74    BMI (Calculated): 25.7    Resp: 16    Temp: 36.3 °C (97.3 °F)    TempSrc: Temporal    O2 sat % room air:  94 %       Current medications as of today   Current Outpatient Prescriptions   Medication Sig Dispense Refill   • lisinopril (PRINIVIL) 10 MG Tab      • metoprolol SR (TOPROL XL) 50 MG TABLET SR 24 HR      • SHINGRIX 50 MCG Recon Susp      • Cyanocobalamin (B-12) 1000 MCG Cap Take  by mouth.     • Olodaterol HCl (STRIVERDI RESPIMAT) 2.5 MCG/ACT Aero Soln Inhale 2 Puffs by mouth every day. 1 " Inhaler 5   • mometasone (ASMANEX 60 METERED DOSES) 220 MCG/INH inhaler Inhale 1 Puff by mouth every day. Rinse mouth after each use. 1 Inhaler 5   • albuterol (PROVENTIL) 2.5mg/3ml Nebu Soln solution for nebulization USE 3 TIMES A DAY AS NEEDED  3   • Olodaterol HCl 2.5 MCG/ACT Aero Soln Inhale 2 Inhalation by mouth every day. Indications: Chronic Obstructive Lung Disease 1 Inhaler 5   • mometasone (ASMANEX) 220 MCG/INH inhaler Inhale 1 Puff by mouth 2 times a day.     • albuterol (PROVENTIL HFA) 108 (90 BASE) MCG/ACT Aero Soln inhalation aerosol Inhale 2 Puffs by mouth every 6 hours as needed for Shortness of Breath.     • Cholecalciferol (VITAMIN D3) 2000 UNITS Tab Take  by mouth.     • Coenzyme Q10 (CO Q-10) 100 MG Cap Take  by mouth.     • tamsulosin (FLOMAX) 0.4 MG capsule Take 0.4 mg by mouth ONE-HALF HOUR AFTER BREAKFAST.     • clopidogrel (PLAVIX) 75 MG TABS Take 75 mg by mouth every day. Indications: Heart Attack     • metoprolol SR (TOPROL XL) 25 MG TB24 Take 12.5 mg by mouth every day.     • atorvastatin (LIPITOR) 20 MG TABS Take 20 mg by mouth every evening.     • LEVALBUTEROL TARTRATE INH 59 mcg 4X/DAY.      • LOVASTATIN 40 MG PO TABS QDAY.      • RANITIDINE  MG PO TABS QDAY.      • ammonium lactate (LAC-HYDRIN) 12 % Lotion APPLY TO AFFECTED AREA TWICE DAILY  2   • Metoprolol-Hydrochlorothiazide 50-12.5 MG TABLET SR 24 HR Take 1 Tab by mouth every day.     • nitroglycerin (NITROSTAT) 0.4 MG SUBL Place 0.4 mg under tongue every 5 minutes as needed.     • simvastatin (ZOCOR) 40 MG TABS Take 40 mg by mouth every evening.     • HYDROCHLOROTHIAZIDE by Does not apply route.     • ALENDRONATE SODIUM 70 MG PO TABS Q7 DAYS.        No current facility-administered medications for this visit.          Physical Exam: ***  Gen:           Alert and oriented, No apparent distress. Mood and affect appropriate, normal interaction with examiner.  Eyes:          PERRL, EOM intact, sclere white, conjunctive  moist.  Ears:          Not examined. No lesions or deformities.  Hearing:     Grossly intact.  Nose:          Normal, no lesions or deformities.  Dentition:    Good dentition.  Oropharynx:   Tongue normal, posterior pharynx without erythema or exudate.  Mallampati Classification: ***  Neck:        Supple, trachea midline, no masses.  Respiratory Effort: No intercostal retractions or use of accessory muscles.   Lung Auscultation:      Clear to auscultation bilaterally; no rales, rhonchi or wheezing.  CV:            Regular rate and rhythm. No murmurs, rubs or gallops.  Abd:           Not examined. Soft non tender, non distended. Normal active bowel sounds. No masses.  Lymphadenopathy: No palpable nodes or edema.  Gait and Station: Normal.  Digits and Nails: No clubbing, cyanosis, petechiae, or nodes.   Cranial Nerves: II-XII grossly intact.  Skin:        No rashes, lesions or ulcers noted.               Ext:           No cyanosis or edema.      Assessment:  1. Chronic obstructive pulmonary disease, unspecified COPD type (HCC)     2. Former smoker     3. Essential hypertension     4. Dyslipidemia     5. BMI 25.0-25.9,adult         Immunizations:    Flu:***  Pneumovax 23:***  Prevnar 13:***    Plan:  ***    Please note that this dictation was created using voice recognition software. I have made every reasonable attempt to correct obvious errors, but it is possible there are errors of grammar and possibly content that I did not discover before finalizing the note.           Electronically signed by CRISTINA Ochoa  on 01/22/19    [address] [address]  VIA Facsimile: [phone]

## 2019-01-31 NOTE — NON-PROVIDER
Patient mailed in letter to inform you he has decided to stay on Asmanex and Striverdi and will not be switching to Symbicort and Spiriva . See scanned document.

## 2020-08-14 ENCOUNTER — HOSPITAL ENCOUNTER (OUTPATIENT)
Facility: MEDICAL CENTER | Age: 85
End: 2020-08-14
Attending: PHYSICIAN ASSISTANT
Payer: MEDICARE

## 2020-08-14 PROCEDURE — 87086 URINE CULTURE/COLONY COUNT: CPT

## 2020-08-17 LAB
BACTERIA UR CULT: NORMAL
SIGNIFICANT IND 70042: NORMAL
SITE SITE: NORMAL
SOURCE SOURCE: NORMAL

## 2021-01-13 DIAGNOSIS — Z23 NEED FOR VACCINATION: ICD-10-CM
